# Patient Record
Sex: MALE | Race: OTHER
[De-identification: names, ages, dates, MRNs, and addresses within clinical notes are randomized per-mention and may not be internally consistent; named-entity substitution may affect disease eponyms.]

---

## 2022-01-10 ENCOUNTER — HOSPITAL ENCOUNTER (INPATIENT)
Dept: HOSPITAL 26 - MED | Age: 72
LOS: 3 days | Discharge: HOME | DRG: 70 | End: 2022-01-13
Payer: MEDICARE

## 2022-01-10 VITALS — WEIGHT: 165 LBS | HEIGHT: 69 IN | BODY MASS INDEX: 24.44 KG/M2

## 2022-01-10 VITALS — DIASTOLIC BLOOD PRESSURE: 66 MMHG | SYSTOLIC BLOOD PRESSURE: 114 MMHG

## 2022-01-10 DIAGNOSIS — R93.89: ICD-10-CM

## 2022-01-10 DIAGNOSIS — G40.909: ICD-10-CM

## 2022-01-10 DIAGNOSIS — N40.0: ICD-10-CM

## 2022-01-10 DIAGNOSIS — C80.1: ICD-10-CM

## 2022-01-10 DIAGNOSIS — E78.00: ICD-10-CM

## 2022-01-10 DIAGNOSIS — C79.31: ICD-10-CM

## 2022-01-10 DIAGNOSIS — Z20.822: ICD-10-CM

## 2022-01-10 DIAGNOSIS — I21.A1: ICD-10-CM

## 2022-01-10 DIAGNOSIS — G93.9: Primary | ICD-10-CM

## 2022-01-10 DIAGNOSIS — E78.5: ICD-10-CM

## 2022-01-10 DIAGNOSIS — C85.90: ICD-10-CM

## 2022-01-10 LAB
ALBUMIN FLD-MCNC: 3.6 G/DL (ref 3.4–5)
ANION GAP SERPL CALCULATED.3IONS-SCNC: 17.9 MMOL/L (ref 8–16)
APPEARANCE UR: CLEAR
AST SERPL-CCNC: 27 U/L (ref 15–37)
BASOPHILS # BLD AUTO: 0 K/UL (ref 0–0.22)
BASOPHILS NFR BLD AUTO: 0.4 % (ref 0–2)
BILIRUB SERPL-MCNC: 0.3 MG/DL (ref 0–1)
BILIRUB UR QL STRIP: NEGATIVE
BUN SERPL-MCNC: 14 MG/DL (ref 7–18)
CHLORIDE SERPL-SCNC: 107 MMOL/L (ref 98–107)
CO2 SERPL-SCNC: 21 MMOL/L (ref 21–32)
COLOR UR: YELLOW
CREAT SERPL-MCNC: 0.9 MG/DL (ref 0.6–1.3)
EOSINOPHIL # BLD AUTO: 0 K/UL (ref 0–0.4)
EOSINOPHIL NFR BLD AUTO: 0.5 % (ref 0–4)
ERYTHROCYTE [DISTWIDTH] IN BLOOD BY AUTOMATED COUNT: 15.3 % (ref 11.6–13.7)
GFR SERPL CREATININE-BSD FRML MDRD: (no result) ML/MIN (ref 90–?)
GLUCOSE SERPL-MCNC: 119 MG/DL (ref 74–106)
GLUCOSE UR STRIP-MCNC: NEGATIVE MG/DL
HCT VFR BLD AUTO: 33 % (ref 36–52)
HGB BLD-MCNC: 11.1 G/DL (ref 12–18)
HGB UR QL STRIP: NEGATIVE
LEUKOCYTE ESTERASE UR QL STRIP: NEGATIVE
LYMPHOCYTES # BLD AUTO: 2.4 K/UL (ref 2–11.5)
LYMPHOCYTES NFR BLD AUTO: 42 % (ref 20.5–51.1)
MCH RBC QN AUTO: 32 PG (ref 27–31)
MCHC RBC AUTO-ENTMCNC: 34 G/DL (ref 33–37)
MCV RBC AUTO: 96.3 FL (ref 80–94)
MONOCYTES # BLD AUTO: 0.8 K/UL (ref 0.8–1)
MONOCYTES NFR BLD AUTO: 14 % (ref 1.7–9.3)
NEUTROPHILS # BLD AUTO: 2.4 K/UL (ref 1.8–7.7)
NEUTROPHILS NFR BLD AUTO: 43.1 % (ref 42.2–75.2)
NITRITE UR QL STRIP: NEGATIVE
PH UR STRIP: 7 [PH] (ref 5–9)
PLATELET # BLD AUTO: 139 K/UL (ref 140–450)
POTASSIUM SERPL-SCNC: 3.9 MMOL/L (ref 3.5–5.1)
PROTHROMBIN TIME: 9.9 SECS (ref 10.8–13.4)
RBC # BLD AUTO: 3.43 MIL/UL (ref 4.2–6.1)
SODIUM SERPL-SCNC: 142 MMOL/L (ref 136–145)
WBC # BLD AUTO: 5.7 K/UL (ref 4.8–10.8)

## 2022-01-10 RX ADMIN — SODIUM CHLORIDE SCH MLS/HR: 9 INJECTION, SOLUTION INTRAVENOUS at 01:40

## 2022-01-10 NOTE — NUR
received pt from EMS and placed to bed 3. 

pt is a 71 year old male with hx of HTN and lymphoma biba from home for severe 
right side headache. per EMS, pt was still alert when they got him. upon 
arrival to ER, pt had a tonic clonic sz for approximately 30secs in the 
ambulance. was given 5 of versed byEMS. presented to ER, unresponsive. code 
stroke called to assume protocol.

## 2022-01-10 NOTE — NUR
pt observed to be arousable, now a/o x 2 to name and . able to make 
conversations, and replaced NRB to 3lpm n/c.

## 2022-01-11 LAB
ANION GAP SERPL CALCULATED.3IONS-SCNC: 12.2 MMOL/L (ref 8–16)
BASOPHILS # BLD AUTO: 0 K/UL (ref 0–0.22)
BASOPHILS NFR BLD AUTO: 0 % (ref 0–2)
BUN SERPL-MCNC: 10 MG/DL (ref 7–18)
CHLORIDE SERPL-SCNC: 110 MMOL/L (ref 98–107)
CO2 SERPL-SCNC: 26.3 MMOL/L (ref 21–32)
CREAT SERPL-MCNC: 0.7 MG/DL (ref 0.6–1.3)
EOSINOPHIL # BLD AUTO: 0.7 K/UL (ref 0–0.4)
EOSINOPHIL NFR BLD AUTO: 14.4 % (ref 0–4)
ERYTHROCYTE [DISTWIDTH] IN BLOOD BY AUTOMATED COUNT: 15.4 % (ref 11.6–13.7)
GFR SERPL CREATININE-BSD FRML MDRD: (no result) ML/MIN (ref 90–?)
GLUCOSE SERPL-MCNC: 91 MG/DL (ref 74–106)
HCT VFR BLD AUTO: 32.9 % (ref 36–52)
HGB BLD-MCNC: 11.3 G/DL (ref 12–18)
LYMPHOCYTES # BLD AUTO: 1 K/UL (ref 2–11.5)
LYMPHOCYTES NFR BLD AUTO: 20.2 % (ref 20.5–51.1)
MAGNESIUM SERPL-MCNC: 1.9 MG/DL (ref 1.8–2.4)
MCH RBC QN AUTO: 33 PG (ref 27–31)
MCHC RBC AUTO-ENTMCNC: 35 G/DL (ref 33–37)
MCV RBC AUTO: 95.7 FL (ref 80–94)
MONOCYTES # BLD AUTO: 0.8 K/UL (ref 0.8–1)
MONOCYTES NFR BLD AUTO: 17.3 % (ref 1.7–9.3)
NEUTROPHILS # BLD AUTO: 2.3 K/UL (ref 1.8–7.7)
NEUTROPHILS NFR BLD AUTO: 48.1 % (ref 42.2–75.2)
PHOSPHATE SERPL-MCNC: 3.1 MG/DL (ref 2.5–4.9)
PLATELET # BLD AUTO: 129 K/UL (ref 140–450)
POTASSIUM SERPL-SCNC: 3.5 MMOL/L (ref 3.5–5.1)
RBC # BLD AUTO: 3.44 MIL/UL (ref 4.2–6.1)
SODIUM SERPL-SCNC: 145 MMOL/L (ref 136–145)
TSH SERPL DL<=0.05 MIU/L-ACNC: 1.51 UIU/ML (ref 0.34–3.74)
WBC # BLD AUTO: 4.8 K/UL (ref 4.8–10.8)

## 2022-01-11 RX ADMIN — DEXAMETHASONE SCH MG: 4 TABLET ORAL at 18:10

## 2022-01-11 RX ADMIN — PANTOPRAZOLE SODIUM SCH MG: 40 TABLET, DELAYED RELEASE ORAL at 09:12

## 2022-01-11 RX ADMIN — SODIUM CHLORIDE SCH MLS/HR: 9 INJECTION, SOLUTION INTRAVENOUS at 22:30

## 2022-01-11 RX ADMIN — DEXAMETHASONE SCH MG: 4 TABLET ORAL at 12:22

## 2022-01-11 NOTE — NUR
PATIENT HAS BEEN SCREENED AND CATEGORIZED AS LOW NUTRITION RISK. PATIENT WILL BE SEEN WITHIN 
7 DAYS OF ADMISSION.



1/17/22



ME[address]U [address]

## 2022-01-11 NOTE — NUR
pt currently a/o x 4 now , gcs 15. able to speak in complete sentences and 
follow commands. NAD VSS

## 2022-01-12 VITALS — SYSTOLIC BLOOD PRESSURE: 100 MMHG | DIASTOLIC BLOOD PRESSURE: 55 MMHG

## 2022-01-12 VITALS — DIASTOLIC BLOOD PRESSURE: 51 MMHG | SYSTOLIC BLOOD PRESSURE: 96 MMHG

## 2022-01-12 VITALS — SYSTOLIC BLOOD PRESSURE: 105 MMHG | DIASTOLIC BLOOD PRESSURE: 70 MMHG

## 2022-01-12 VITALS — DIASTOLIC BLOOD PRESSURE: 48 MMHG | SYSTOLIC BLOOD PRESSURE: 95 MMHG

## 2022-01-12 VITALS — DIASTOLIC BLOOD PRESSURE: 69 MMHG | SYSTOLIC BLOOD PRESSURE: 103 MMHG

## 2022-01-12 VITALS — DIASTOLIC BLOOD PRESSURE: 58 MMHG | SYSTOLIC BLOOD PRESSURE: 104 MMHG

## 2022-01-12 LAB
ANION GAP SERPL CALCULATED.3IONS-SCNC: 12.4 MMOL/L (ref 8–16)
BASOPHILS # BLD AUTO: 0 K/UL (ref 0–0.22)
BASOPHILS NFR BLD AUTO: 0.3 % (ref 0–2)
BUN SERPL-MCNC: 21 MG/DL (ref 7–18)
CHLORIDE SERPL-SCNC: 109 MMOL/L (ref 98–107)
CO2 SERPL-SCNC: 26.2 MMOL/L (ref 21–32)
CREAT SERPL-MCNC: 0.6 MG/DL (ref 0.6–1.3)
EOSINOPHIL # BLD AUTO: 0 K/UL (ref 0–0.4)
EOSINOPHIL NFR BLD AUTO: 0 % (ref 0–4)
ERYTHROCYTE [DISTWIDTH] IN BLOOD BY AUTOMATED COUNT: 15.6 % (ref 11.6–13.7)
GFR SERPL CREATININE-BSD FRML MDRD: (no result) ML/MIN (ref 90–?)
GLUCOSE SERPL-MCNC: 121 MG/DL (ref 74–106)
HCT VFR BLD AUTO: 32.2 % (ref 36–52)
HGB BLD-MCNC: 10.9 G/DL (ref 12–18)
LYMPHOCYTES # BLD AUTO: 1 K/UL (ref 2–11.5)
LYMPHOCYTES NFR BLD AUTO: 20.8 % (ref 20.5–51.1)
MCH RBC QN AUTO: 33 PG (ref 27–31)
MCHC RBC AUTO-ENTMCNC: 34 G/DL (ref 33–37)
MCV RBC AUTO: 96.3 FL (ref 80–94)
MONOCYTES # BLD AUTO: 0.2 K/UL (ref 0.8–1)
MONOCYTES NFR BLD AUTO: 3.5 % (ref 1.7–9.3)
NEUTROPHILS # BLD AUTO: 3.8 K/UL (ref 1.8–7.7)
NEUTROPHILS NFR BLD AUTO: 75.4 % (ref 42.2–75.2)
PLATELET # BLD AUTO: 127 K/UL (ref 140–450)
POTASSIUM SERPL-SCNC: 4.6 MMOL/L (ref 3.5–5.1)
RBC # BLD AUTO: 3.34 MIL/UL (ref 4.2–6.1)
SODIUM SERPL-SCNC: 143 MMOL/L (ref 136–145)
WBC # BLD AUTO: 5 K/UL (ref 4.8–10.8)

## 2022-01-12 PROCEDURE — 4A10X4Z MONITORING OF CENTRAL NERVOUS ELECTRICAL ACTIVITY, EXTERNAL APPROACH: ICD-10-PCS

## 2022-01-12 RX ADMIN — SODIUM CHLORIDE SCH MLS/HR: 9 INJECTION, SOLUTION INTRAVENOUS at 22:30

## 2022-01-12 RX ADMIN — ATORVASTATIN CALCIUM SCH MG: 80 TABLET, FILM COATED ORAL at 08:35

## 2022-01-12 RX ADMIN — TAMSULOSIN HYDROCHLORIDE SCH MG: 0.4 CAPSULE ORAL at 08:35

## 2022-01-12 RX ADMIN — DEXAMETHASONE SCH MG: 4 TABLET ORAL at 23:06

## 2022-01-12 RX ADMIN — DEXAMETHASONE SCH MG: 4 TABLET ORAL at 00:54

## 2022-01-12 RX ADMIN — DEXAMETHASONE SCH MG: 4 TABLET ORAL at 06:00

## 2022-01-12 RX ADMIN — PANTOPRAZOLE SODIUM SCH MG: 40 TABLET, DELAYED RELEASE ORAL at 08:35

## 2022-01-12 RX ADMIN — DEXAMETHASONE SCH MG: 4 TABLET ORAL at 18:19

## 2022-01-12 RX ADMIN — DEXAMETHASONE SCH MG: 4 TABLET ORAL at 12:27

## 2022-01-12 RX ADMIN — ASPIRIN TAB DELAYED RELEASE 81 MG SCH MG: 81 TABLET DELAYED RESPONSE at 08:36

## 2022-01-12 NOTE — NUR
PT RESTING IN BED, NO S/S OF DISTRESS. CALL LIGHT IN REACH. ALL SAFETY MEASURES IN PLACE. 
BREATHING SYMMETRICAL

## 2022-01-12 NOTE — NUR
PT DETACHED FROM IV FOR RESTROOM USE. NO S/S OF DISTRESS. CALL LIGHT IN REACH. ALL SAFETY 
MEASURES IN PLACE.

## 2022-01-12 NOTE — NUR
EEG WAS COMPLETED. PT RESTING IN BED. NO S/S OF DISTRESS. CALL LIGHT IN REACH. ALL SAFETY 
MEASURES IN PLACE

## 2022-01-12 NOTE — NUR
PT RESTING IN BED. EEG TECH AT BEDSIDE. PT PULLED OUT IV. PT GIVEN MORNING MEDS. HEPARIN 
ADMINISTERED AS 1/11/22 2100 DOSE DUE TO NIGHT SHIFT ADMINISTRATION FOR 1/12/22 0900 DOSE. 
PT STABLE. CALL LIGHT IN REACH. ALL SAFETY MEASURES IN PLACE

## 2022-01-12 NOTE — NUR
KEPPRA  AND HEPARIN PULLED OUT FROM PYXIS , OPENED - PT REFUSED .

-------------------------------------------------------------------------------

Addendum: 01/12/22 at 2128 by Corrie Martin RN

-------------------------------------------------------------------------------

INFORM DR. LAGUERRE ABOUT THE REFUSAL OF KEPPRA AND HEPARIN SQ.

## 2022-01-12 NOTE — NUR
PATIENT TO ROOM 2130 1/11/22 AWAKE ALERT NO C/O OF PAIN ON MONITOR SINUS TEMP 97.6 B/P 
113/61 IS ROOM AIR. NKA.

 START N.S40 HOUR. SAT 98%. PATIENT HAS HIS ON MEDICATION AT BED SIDE STATED DAUGHTER WILL 
TAKE HOME TOMORROW

ALSO HAS HIS COVID CARD AT BED SIDE. PATIENT CAN AMBULATE WELL LIKES TO WEAR A DIAPER. NO 
SIGNS OF DISTRESS. 

NO C/O OF PAIN LUNGS CLEAR.

## 2022-01-13 VITALS — DIASTOLIC BLOOD PRESSURE: 53 MMHG | SYSTOLIC BLOOD PRESSURE: 99 MMHG

## 2022-01-13 VITALS — SYSTOLIC BLOOD PRESSURE: 96 MMHG | DIASTOLIC BLOOD PRESSURE: 51 MMHG

## 2022-01-13 VITALS — DIASTOLIC BLOOD PRESSURE: 55 MMHG | SYSTOLIC BLOOD PRESSURE: 100 MMHG

## 2022-01-13 LAB
ANION GAP SERPL CALCULATED.3IONS-SCNC: 10.6 MMOL/L (ref 8–16)
BASOPHILS # BLD AUTO: 0 K/UL (ref 0–0.22)
BASOPHILS NFR BLD AUTO: 0.1 % (ref 0–2)
BUN SERPL-MCNC: 25 MG/DL (ref 7–18)
CHLORIDE SERPL-SCNC: 111 MMOL/L (ref 98–107)
CO2 SERPL-SCNC: 27.8 MMOL/L (ref 21–32)
CREAT SERPL-MCNC: 0.6 MG/DL (ref 0.6–1.3)
EOSINOPHIL # BLD AUTO: 0 K/UL (ref 0–0.4)
EOSINOPHIL NFR BLD AUTO: 0 % (ref 0–4)
ERYTHROCYTE [DISTWIDTH] IN BLOOD BY AUTOMATED COUNT: 15.4 % (ref 11.6–13.7)
GFR SERPL CREATININE-BSD FRML MDRD: (no result) ML/MIN (ref 90–?)
GLUCOSE SERPL-MCNC: 119 MG/DL (ref 74–106)
HCT VFR BLD AUTO: 31.2 % (ref 36–52)
HGB BLD-MCNC: 10.6 G/DL (ref 12–18)
LYMPHOCYTES # BLD AUTO: 0.6 K/UL (ref 2–11.5)
LYMPHOCYTES NFR BLD AUTO: 9.6 % (ref 20.5–51.1)
MCH RBC QN AUTO: 33 PG (ref 27–31)
MCHC RBC AUTO-ENTMCNC: 34 G/DL (ref 33–37)
MCV RBC AUTO: 96.3 FL (ref 80–94)
MONOCYTES # BLD AUTO: 0.6 K/UL (ref 0.8–1)
MONOCYTES NFR BLD AUTO: 9 % (ref 1.7–9.3)
NEUTROPHILS # BLD AUTO: 5.1 K/UL (ref 1.8–7.7)
NEUTROPHILS NFR BLD AUTO: 81.3 % (ref 42.2–75.2)
PLATELET # BLD AUTO: 130 K/UL (ref 140–450)
POTASSIUM SERPL-SCNC: 4.4 MMOL/L (ref 3.5–5.1)
RBC # BLD AUTO: 3.23 MIL/UL (ref 4.2–6.1)
SODIUM SERPL-SCNC: 145 MMOL/L (ref 136–145)
WBC # BLD AUTO: 6.2 K/UL (ref 4.8–10.8)

## 2022-01-13 RX ADMIN — ATORVASTATIN CALCIUM SCH MG: 80 TABLET, FILM COATED ORAL at 08:15

## 2022-01-13 RX ADMIN — DEXAMETHASONE SCH MG: 4 TABLET ORAL at 12:23

## 2022-01-13 RX ADMIN — DEXAMETHASONE SCH MG: 4 TABLET ORAL at 05:04

## 2022-01-13 RX ADMIN — ASPIRIN TAB DELAYED RELEASE 81 MG SCH MG: 81 TABLET DELAYED RESPONSE at 08:14

## 2022-01-13 RX ADMIN — PANTOPRAZOLE SODIUM SCH MG: 40 TABLET, DELAYED RELEASE ORAL at 08:15

## 2022-01-13 RX ADMIN — TAMSULOSIN HYDROCHLORIDE SCH MG: 0.4 CAPSULE ORAL at 08:14

## 2022-01-13 NOTE — NUR
RECEIVED REPORT FROM NIGHT SHIFT NURSE FOR CONTINUITY OF CARE. PT IN BED AWAKE, ALERT, 
VERBAL. RESPIRATION EVEN AND UNLABORED. DENIES PAIN OR DISCOMFORT AT THIS TIME WITH RAC 20G 
IV, NO REDNESS OR SWELLING NOTED. CALL LIGHT PLACED WITHIN EASY REACH. ALL SAFETY MEASURES 
IN PLACE.

## 2022-01-13 NOTE — NUR
ASSISTED PT TO RESTROOM. SUPPLIES PROVIDED FOR BATHING AND ORAL CARE. NO S/S OF DISTRESS. 
ALL SAFETY MEASURES IN PLACE Replied with the following message:    \"Humphrey Marte.  Dr. Crow left the office around 3:00 this afternoon and will return on Monday.  We will reach out to you after we are able to discuss this with her on Monday, thanks.    JERRY Garcia Dr[address]s office\"    Please address with Dr. Crow on Monday 9/24/18.

## 2022-01-13 NOTE — NUR
PT DISCHARGED TO HOME PICKED UP BY DAUGHTER CHRIS VIA PRIVATE VEHICLE WITH BELONGINGS. PT 
A/OX4, AMBULATORY. RESPIRATION EVEN AND UNLABORED. DENIES PAIN OR DISCOMFORT. IV REMOVED 
WITH CANNULA INTACT.

## 2022-01-13 NOTE — NUR
DC PLANNING:

THE PATIENT PRESENTED TO THE ED WITH NEW ONSET FOCAL SEIZURE. H/O LYMPHOMA X 6 MONTHS PER 
FAMILY, PATIENT HAD 6 OF 8 SESSIONS OF IV CHEMO, QUESTIONS IF HE ACTUALLY HAS LYMPHOMA. 
PATIENT WAS WITH [place] AND HAS CHANGED TO [facility], CM SPOKE WITH HIS DAUGHTER CHRIS BY PHONE 
AND ENDORSED THAT SHE NEEDS TO MAKE AN APPOINTMENT WITH HIS NEW PCP SO THAT THE PATIENT CAN 
BE REFERRED TO A HEM/ONC FOR MANAGEMENT.

THE PATIENT LIVES IN A SINGLE STORY HOUSE WITH HIS WIFE, DAUGHTER, SON, SON IN LAW AND 2 
GRANDCHILDREN. HIS WIFE IS WC BOUND AND HIS DAUGHTER WORKS FROM HOME, NEIGHBORS SOMETIMES 
HELP WITH THE MOTHERS CARE. THE PATIENT IS NORMALLY VERY PHYSICALLY ACTIVE BUT DID HAVE SIDE 
EFFECTS FROM THE CHEMO OF WEAKNESS.

THE PATIENT WAS SEEN BY THE NEUROLOGIST AND HAD AN EEG, INTERPRETATION OF LIKELY LYMPHOMA 
INVOLVEMENT RELATING TO HIS SEIZURE.

THE PATIENT WILL DC HOME TODAY WITH FAMILY AND WILL FOLLOW UP WITH HIS PCP FOR A REFERRAL TO 
A NEW HEM/ONC UNDER HIS [facility] INSURANCE.

CM WILL FOLLOW FOR NEEDS.